# Patient Record
Sex: FEMALE | Race: BLACK OR AFRICAN AMERICAN | NOT HISPANIC OR LATINO | Employment: OTHER | URBAN - METROPOLITAN AREA
[De-identification: names, ages, dates, MRNs, and addresses within clinical notes are randomized per-mention and may not be internally consistent; named-entity substitution may affect disease eponyms.]

---

## 2021-11-22 ENCOUNTER — APPOINTMENT (OUTPATIENT)
Dept: GENERAL RADIOLOGY | Facility: HOSPITAL | Age: 41
End: 2021-11-22

## 2021-11-22 ENCOUNTER — HOSPITAL ENCOUNTER (EMERGENCY)
Facility: HOSPITAL | Age: 41
Discharge: HOME | End: 2021-11-22
Attending: EMERGENCY MEDICINE | Admitting: EMERGENCY MEDICINE

## 2021-11-22 VITALS
WEIGHT: 197.75 LBS | RESPIRATION RATE: 18 BRPM | HEART RATE: 98 BPM | SYSTOLIC BLOOD PRESSURE: 125 MMHG | OXYGEN SATURATION: 100 % | BODY MASS INDEX: 29.97 KG/M2 | TEMPERATURE: 98.9 F | HEIGHT: 68 IN | DIASTOLIC BLOOD PRESSURE: 85 MMHG

## 2021-11-22 DIAGNOSIS — J06.9 VIRAL UPPER RESPIRATORY TRACT INFECTION: ICD-10-CM

## 2021-11-22 DIAGNOSIS — Z20.822 COVID-19 VIRUS TEST RESULT UNKNOWN: Primary | ICD-10-CM

## 2021-11-22 LAB
QT INTERVAL: 356 MS
SARS-COV-2 N GENE RESP QL NAA+PROBE: NOT DETECTED

## 2021-11-22 PROCEDURE — 93005 ELECTROCARDIOGRAM TRACING: CPT | Performed by: EMERGENCY MEDICINE

## 2021-11-22 PROCEDURE — 25010000002 DEXAMETHASONE PER 1 MG: Performed by: EMERGENCY MEDICINE

## 2021-11-22 PROCEDURE — 93010 ELECTROCARDIOGRAM REPORT: CPT | Performed by: INTERNAL MEDICINE

## 2021-11-22 PROCEDURE — 71045 X-RAY EXAM CHEST 1 VIEW: CPT

## 2021-11-22 PROCEDURE — 87635 SARS-COV-2 COVID-19 AMP PRB: CPT | Performed by: EMERGENCY MEDICINE

## 2021-11-22 PROCEDURE — 99283 EMERGENCY DEPT VISIT LOW MDM: CPT

## 2021-11-22 RX ORDER — DEXAMETHASONE SODIUM PHOSPHATE 10 MG/ML
8 INJECTION INTRAMUSCULAR; INTRAVENOUS ONCE
Status: COMPLETED | OUTPATIENT
Start: 2021-11-22 | End: 2021-11-22

## 2021-11-22 RX ORDER — ALBUTEROL SULFATE 90 UG/1
2 AEROSOL, METERED RESPIRATORY (INHALATION) EVERY 4 HOURS PRN
Qty: 6.7 G | Refills: 0 | Status: SHIPPED | OUTPATIENT
Start: 2021-11-22

## 2021-11-22 RX ORDER — DEXAMETHASONE 4 MG/1
4 TABLET ORAL
Qty: 4 TABLET | Refills: 0 | Status: SHIPPED | OUTPATIENT
Start: 2021-11-22

## 2021-11-22 RX ORDER — FERROUS SULFATE 325(65) MG
325 TABLET ORAL
COMMUNITY

## 2021-11-22 RX ORDER — DEXTROMETHORPHAN HYDROBROMIDE AND PROMETHAZINE HYDROCHLORIDE 15; 6.25 MG/5ML; MG/5ML
5 SYRUP ORAL 4 TIMES DAILY PRN
Qty: 80 ML | Refills: 0 | Status: SHIPPED | OUTPATIENT
Start: 2021-11-22

## 2021-11-22 RX ADMIN — DEXAMETHASONE SODIUM PHOSPHATE 8 MG: 10 INJECTION INTRAMUSCULAR; INTRAVENOUS at 01:15

## 2021-11-24 ENCOUNTER — TELEMEDICINE (OUTPATIENT)
Dept: FAMILY MEDICINE CLINIC | Facility: TELEHEALTH | Age: 41
End: 2021-11-24

## 2021-11-24 DIAGNOSIS — R11.2 NAUSEA AND VOMITING, INTRACTABILITY OF VOMITING NOT SPECIFIED, UNSPECIFIED VOMITING TYPE: ICD-10-CM

## 2021-11-24 DIAGNOSIS — Z20.822 ENCOUNTER BY TELEHEALTH FOR SUSPECTED COVID-19: Primary | ICD-10-CM

## 2021-11-24 DIAGNOSIS — R52 BODY ACHES: ICD-10-CM

## 2021-11-24 DIAGNOSIS — R19.7 DIARRHEA, UNSPECIFIED TYPE: ICD-10-CM

## 2021-11-24 DIAGNOSIS — R43.2 LOSS OF TASTE: ICD-10-CM

## 2021-11-24 PROCEDURE — 99213 OFFICE O/P EST LOW 20 MIN: CPT | Performed by: NURSE PRACTITIONER

## 2021-11-24 PROCEDURE — 87635 SARS-COV-2 COVID-19 AMP PRB: CPT | Performed by: NURSE PRACTITIONER

## 2021-11-24 RX ORDER — ONDANSETRON 4 MG/1
4 TABLET, ORALLY DISINTEGRATING ORAL EVERY 8 HOURS PRN
Qty: 9 TABLET | Refills: 0 | Status: SHIPPED | OUTPATIENT
Start: 2021-11-24

## 2021-11-24 NOTE — PROGRESS NOTES
You have chosen to receive care through a telehealth visit.  Do you consent to use a video/audio connection for your medical care today? Yes     CHIEF COMPLAINT  Chief Complaint   Patient presents with   • Nausea   • Vomiting   • Generalized Body Aches   • Loss Of Taste   • Exposure To Known Illness         HPI  Pedro Watson is a 41 y.o. female  presents with complaint of4 days of nausea, vomiting, body aches, loss of apetite and loss of taste. Her daughter was diagnosed with covid 19 last week and she was around her several days then became symptomatic. She tested negative 2 days ago but is requesting a re test due to her worsening symptoms. She has been vaccinated against covid 19. She has not used anything for symptoms.     Review of Systems   Constitutional: Positive for activity change, appetite change and fatigue.   HENT: Negative.    Respiratory: Negative.    Cardiovascular: Negative.    Gastrointestinal: Positive for abdominal pain (generalized), diarrhea, nausea and vomiting. Negative for abdominal distention, blood in stool and constipation.   Musculoskeletal: Positive for myalgias.   Skin: Negative.    Hematological: Negative.    Psychiatric/Behavioral: Negative.        Past Medical History:   Diagnosis Date   • Anemia    • Asthma        No family history on file.    Social History     Socioeconomic History   • Marital status:    Tobacco Use   • Smoking status: Never Smoker   • Smokeless tobacco: Never Used   Vaping Use   • Vaping Use: Never used   Substance and Sexual Activity   • Alcohol use: Yes     Comment: occasional   • Drug use: Never   • Sexual activity: Defer         There were no vitals taken for this visit.    PHYSICAL EXAM  Physical Exam   Constitutional: She is oriented to person, place, and time. She appears well-developed and well-nourished. She does not have a sickly appearance. She does not appear ill. No distress.   HENT:   Head: Normocephalic and atraumatic.   Pulmonary/Chest:  Effort normal.  No respiratory distress.  Abdominal: She exhibits no distension. There is abdominal tenderness in the periumbilical area.   Neurological: She is alert and oriented to person, place, and time.   Psychiatric: She has a normal mood and affect.   Vitals reviewed.      Results for orders placed or performed during the hospital encounter of 11/22/21   COVID-19,CEPHEID/EUSEBIO/BDMAX,COR/LUIZ/PAD/NICOLAS IN-HOUSE(OR EMERGENT/ADD-ON),NP SWAB IN TRANSPORT MEDIA 3-4 HR TAT, RT-PCR - Swab, Nasopharynx    Specimen: Nasopharynx; Swab   Result Value Ref Range    COVID19 Not Detected Not Detected - Ref. Range   ECG 12 Lead   Result Value Ref Range    QT Interval 356 ms       Diagnoses and all orders for this visit:    1. Encounter by telehealth for suspected COVID-19 (Primary)  -     COVID-19,CEPHEID/EUSEBIO/BDMAX,COR/LUIZ/PAD/NICOLAS IN-HOUSE(OR EMERGENT/ADD-ON),NP SWAB IN TRANSPORT MEDIA 3-4 HR TAT, RT-PCR - Swab, Nasopharynx; Future  -     QUESTIONNAIRE SERIES    2. Nausea and vomiting, intractability of vomiting not specified, unspecified vomiting type  -     ondansetron ODT (Zofran ODT) 4 MG disintegrating tablet; Place 1 tablet on the tongue Every 8 (Eight) Hours As Needed for Nausea or Vomiting.  Dispense: 9 tablet; Refill: 0    3. Diarrhea, unspecified type    4. Body aches    5. Loss of taste    rest and decaffeinated fluids.   Quarantine until test results.   Ibu 400-600 mg po every 6 hours prn body aches.   Vitamin C, D and zinc.       FOLLOW-UP  As discussed during visit with PCP/[facility] if no improvement or Urgent Care/Emergency Department if worsening of symptoms    Patient verbalizes understanding of medication dosage, comfort measures, instructions for treatment and follow-up.    Gladis Mc, APRN  11/24/2021  15:46 EST    This visit was performed via Telehealth.  This patient has been instructed to follow-up with their primary care provider if their symptoms worsen or the treatment provided does not  resolve their illness.

## 2021-11-24 NOTE — PATIENT INSTRUCTIONS
10 Things You Can Do to Manage Your COVID-19 Symptoms at Home  If you have possible or confirmed COVID-19:  1. Stay home except to get medical care.  2. Monitor your symptoms carefully. If your symptoms get worse, call your healthcare provider immediately.  3. Get rest and stay hydrated.  4. If you have a medical appointment, call the healthcare provider ahead of time and tell them that you have or may have COVID-19.  5. For medical emergencies, call 911 and notify the dispatch personnel that you have or may have COVID-19.  6. Cover your cough and sneezes with a tissue or use the inside of your elbow.  7. Wash your hands often with soap and water for at least 20 seconds or clean your hands with an alcohol-based hand  that contains at least 60% alcohol.  8. As much as possible, stay in a specific room and away from other people in your home. Also, you should use a separate bathroom, if available. If you need to be around other people in or outside of the home, wear a mask.  9. Avoid sharing personal items with other people in your household, like dishes, towels, and bedding.  10. Clean all surfaces that are touched often, like counters, tabletops, and doorknobs. Use household cleaning sprays or wipes according to the label instructions.  cdc.gov/coronavirus  07/16/2021  This information is not intended to replace advice given to you by your health care provider. Make sure you discuss any questions you have with your health care provider.  Document Revised: 08/04/2021 Document Reviewed: 08/04/2021  Elsevier Patient Education © 2021 [employer] Inc[employer]      Vomissements chez l’adulte  Vomiting, Adult  Mira vomissements se produisent lorsque le contenu de l'estomac est projeté vers le haut et hors de la bouche. Souvent, les personnes ont mira nausées herson de vomir. Les vomissements peuvent vous affaiblir et vous déshydrater. La déshydratation peut entraîner une sensation de fatigue et de soif, vous dessécher la bouche  et diminuer la fréquence à laquelle vous urinez. Les personnes âgées, les personnes malades et celles dont le système de défense de l'organisme (système immunitaire) est affaibli sont plus exposées au risque de déshydratation. Il est important de traiter les vomissements en suivant les recommandations de votre prestataire de soins de santé.  Suivez les instructions suivantes à domicile :    Alimentation et boissons         Suivez les recommandations de votre prestataire de soins de santé concernant les points suivants :  · Prenez une solution de réhydratation orale (SRO). C'est une boisson shamika est vendue dans les pharmacies et autres points de vente.  · Mangez autant que possible mira aliments sans trop de saveur et faciles à digérer en petites quantités. Rock aliments incluent les bananes, la compote de pommes, le tatiana, les viandes maigres, les toasts et les craquelins.  · Buvez lentement et autant que possible mira liquides clairs en petites quantités. Les liquides clairs incluent l'eau, les copeaux de glace, les boissons sportives à faible teneur en calories et le jus de fruits dilué avec de l'eau.  · Évitez les liquides contenant beaucoup de sucre ou de caféine, comme les boissons énergisantes, les boissons sportives et les sodas.  · Évitez la consommation d'alcool.  · Évitez les aliments épicés ou gras.    Instructions générales  · Lavez-vous les jasen souvent avec du desi et de l'eau. Si vous n'avez pas de desi et d'eau à disposition, utilisez un désinfectant pour les jasen. Assurez-vous que tous les membres de votre famille se lavent les jasen régulièrement.  · Prenez roland médicaments en vente sari et kim ordonnance en suivant scrupuleusement les instructions de votre prestataire de soins de santé.  · Restez chez vous et reposez-vous pendant votre convalescence.  · Surveillez roland symptômes pour déceler tout changement.  · Rendez-vous à toutes roland visites de suivi, comme prévu par votre prestataire de soins de  santé. C'est important.  Prenez contact avec un prestataire de soins de santé si :  · Semaj vomissements s'aggravent.  · Vous avez de nouveaux symptômes.  · Vous avez de la fièvre.  · Vous ne pouvez pas boire de liquides sans vomir.  · Vous avez eliezer étourdissements ou eliezer vertiges.  · Vous avez eliezer maux de tête.  · Vous avez eliezer crampes musculaires.  · Vous présentez une éruption cutanée.  · Vous avez eliezer douleurs lors de la miction.  Demandez immédiatement de l'aide si :  · Vous ressentez eliezer douleurs dans la poitrine, le cou, le bras ou la mâchoire.  · Vous vous sentez extrêmement faible ou vous vous évanouissez.  · Vous avez eliezer vomissements persistants.  · Semaj vomissements sont rouge vif ou noir comme le café moulu.  · Vous avez eliezer selles sanglantes ou noires ou eliezer selles shamika ressemblent à du goudron.  · Vous avez eliezer maux de tête intenses et/ou un cou raide.  · Vous ressentez de fritz douleurs, eliezer crampes ou un gonflement au niveau de l'abdomen.  · Vous avez de la difficulté à respirer ou vous respirez très rapidement.  · Votre cœur bat très corrie.  · Votre peau est froide et moite.  · Vous vous sentez désorienté(e).  · Vous présentez eliezer signes de déshydratation, tels que :  ? Une urine foncée, peu ou pas d'urine.  ? Eliezer lèvres gercées.  ? Une bouche sèche.  ? Eliezer yeux enfoncés.  ? Une somnolence.  ? Une asthénie.  Rock symptômes peuvent être le tabby d'un problème grave et constituer une situation d'urgence. N'attendez pas de voir si les symptômes disparaissent. Vous devez consulter immédiatement un médecin. Appelez les services d'urgence locaux (911 [place] Ne prenez pas le volant, faites-vous accompagner à l'hôpital.  Résumé  · Eliezer vomissements se produisent lorsque le contenu de l'estomac est projeté vers le haut et hors de la bouche. Les vomissements peuvent vous déshydrater. Les personnes âgées, les personnes malades et celles dont le système immunitaire est affaibli sont plus exposées au risque  de déshydratation.  · Il est important de traiter les vomissements en suivant les recommandations de votre prestataire de soins de santé. Suivez les instructions de votre prestataire de soins de santé concernant ce que vous pouvez manger et boire.  · Lavez-vous les jasen souvent avec du desi et de l'eau. Si vous n'avez pas de desi et d'eau à disposition, utilisez un désinfectant pour les jasen. Assurez-vous que tous les membres de votre famille se lavent les jasen régulièrement.  · Surveillez votre état de santé pour déceler tout changement et tout tabby de déshydratation.  · Rendez-vous à toutes roland visites de suivi, comme prévu par votre prestataire de soins de santé. C'est important.  Rock conseils et renseignements ne sauraient se substituer à l’david médical de votre prestataire de soins de santé. Par conséquent, il est primordial de parler de toutes roland préoccupations avec votre prestataire de soins de santé.  Document Revised: 07/23/2019 Document Reviewed: 07/23/2019  Elsevier Patient Education © 2021 [employer]    Náuseas, en adultos  Nausea, Adult  Las náuseas son michael sensación de malestar en el estómago o de que se está por vomitar. Las náuseas en sí a menudo no constituyen michael preocupación importante, pam pueden ser un signo temprano de problemas médicos más graves. Si las náuseas empeoran, pueden provocar vómitos. Si los vómitos empeoran o si usted no puede beber suficiente líquido, corre el riesgo de deshidratarse. La deshidratación puede causarle cansancio, sed, sequedad en la boca y disminución en la frecuencia con la que orina. Los adultos mayores y las personas que tienen otras enfermedades o un sistema de defensa (sistema inmunitario) débil tienen mayor riesgo de sufrir deshidratación. Los objetivos principales del tratamiento de las náuseas son los siguientes:  · Aliviar carson náuseas.  · Restringir los episodios reiterados de náuseas.  · Prevenir los vómitos y la deshidratación.  Siga estas  indicaciones en fontenot casa:  Controle carson síntomas para detectar cualquier cambio. Informe al médico acerca de los cambios. Siga las siguientes indicaciones destiny se lo haya indicado fontenot médico.  Comida y bebida         · Talmage michael solución de rehidratación oral (SRO). Esta es michael bebida que se vende en farmacias y tiendas minoristas.  · En la medida en que pueda, keke líquidos juana lentamente y en pequeñas cantidades. Los líquidos juana incluyen agua, cubitos de hielo, bebidas deportivas bajas en calorías y jugo de fruta rebajado con agua (jugo de fruta diluido).  · En la medida en que pueda, consuma alimentos blandos y fáciles de digerir en pequeñas cantidades. Estos alimentos incluyen bananas, compota de manzana, arroz, rajan magras, tostadas y galletas saladas.  · Evite consumir líquidos que contengan mucha azúcar o cafeína, destiny bebidas energéticas, bebidas deportivas y refrescos.  · Evite richard alcohol.  · Evite los alimentos condimentados o con alto contenido de grasa.  Indicaciones generales  · Talmage los medicamentos de venta sari y los recetados solamente destiny se lo haya indicado el médico.  · Descanse en fontenot casa mientras se recupera.  · Keke suficiente líquido destiny para mantener la orina de color amarillo pálido.  · Cuando sienta náuseas, respire lenta y profundamente.  · Evite oler cosas que tengan olores liborio.  · Lávese las dmitriy frecuentemente usando agua y jabón. Use desinfectante para dmitriy si no dispone de agua y jabón.  · Asegúrese de que todas las personas que viven en fontenot casa se laven alec las dmitriy y con frecuencia.  · Concurra a todas las visitas de control destiny se lo haya indicado el médico. [place] es importante.  Comuníquese con un médico si:  · Las náuseas empeoran.  · Las náuseas no desaparecen después de dos días.  · Vomita.  · No puede beber líquidos sin vomitar.  · Tiene alguno de los siguientes síntomas:  ? Síntomas nuevos.  ? Fiebre.  ? Dolor de rosetta.  ? Calambres  musculares.  ? Erupción cutánea.  ? Dolor al orinar.  · Se siente aturdido o mareado.  Solicite ayuda inmediatamente si:  · Siente dolor en el pecho, el sherrell, los brazos o la mandíbula.  · Se siente muy débil o se desmaya.  · Vomita y el vómito es de color anand intenso o se asemeja al poso del café.  · Tiene heces con marielle, de color jaiden, o con aspecto alquitranado.  · Siente dolor de rosetta intenso, rigidez en el sherrell, o ambas cosas.  · Tiene dolor intenso, cólicos o distensión abdominal.  · Tiene dificultades para respirar o respira muy rápido.  · Fontenot corazón late muy rápidamente.  · Siente la piel fría y húmeda.  · Se siente confundido.  · Tiene signos de deshidratación, destiny los siguientes:  ? Orina de color oscuro, muy escasa o falta de orina.  ? Labios agrietados.  ? Sequedad de boca.  ? Ojos hundidos.  ? Somnolencia.  ? Debilidad.  Estos síntomas pueden representar un problema grave que constituye michael emergencia. No espere a carmelo si los síntomas desaparecen. Solicite atención médica de inmediato. Comuníquese con el servicio de emergencias de fontenot localidad (911 en los [place]). No conduzca por carson propios medios hasta el hospital.  Resumen  · Las náuseas son michael sensación de malestar en el estómago o de que se está por vomitar. Las náuseas en sí a menudo no constituyen michael preocupación importante, pam pueden ser un signo temprano de problemas médicos más graves.  · Si los vómitos empeoran o si usted no puede beber suficiente líquido, corre el riesgo de deshidratarse.  · Siga las recomendaciones sobre qué debe comer y beber y use los medicamentos de venta sari y recetados solamente destiny se lo haya indicado el médico.  · Comuníquese con un médico de inmediato si los síntomas empeoran o si presenta nuevos síntomas.  · Concurra a todas las visitas de control destiny se lo haya indicado el médico. [place] es importante.  Esta información no tiene destiny fin reemplazar el consejo del médico. Asegúrese de  hacerle al médico cualquier pregunta que tenga.  Document Revised: 08/08/2019 Document Reviewed: 08/08/2019  Elsevier Patient Education © 2021 Elsevier Inc.  COVID-19&nbsp;: Différence entre quarantaine et isolement  COVID-19: Quarantine vs. Isolation  La QUARANTAINE maintient une personne ayant été en contact étroit avec une personne atteinte de COVID-19 à l’écart mira autres.  Si vous avez été en contact étroit avec une personne atteinte de COVID-19  · La meilleure façon de vous protéger et de protéger les autres est de rester chez vous pendant les 14 jours shamika suivront votre dernier contact. Consultez le site internet du département chargé de la santé de [facility] lieu de résidence pour connaître les options disponibles dans [facility] pour éventuellement raccourcir cette période de quarantaine.  · Vérifiez votre température deux fois par jour et surveillez l’apparition de symptômes de COVID-19.  · Si possible, restez à l’écart mira personnes présentant un risque plus élevé d’être gravement malades si elles contractent la COVID-19.  L’ISOLEMENT maintient une personne malade ou testée positive pour la COVID-19 et n’ayant aucun symptôme à l’écart mira autres personnes, même de celles vivant sous son toit.  Si vous êtes malade et si vous pensez avoir, ou savez que vous avez, la COVID-19  · Restez chez vous jusqu’à ce que  ? Au moins 10 jours se soient écoulés depuis l’apparition de roland premiers symptômes et  ? Au moins 24 heures se soient écoulées sans que vous ayez eu de fièvre et sans prendre de médicaments pour faire baisser la fièvre et  ? Les symptômes se soient améliorés  Si le résultat de votre test de dépistage de la COVID-19 s’est avéré positif, mais que vous ne présentez aucun symptôme  · Restez chez vous jusqu’à ce que  ? 10 jours se soient écoulés depuis le résultat positif de votre test  Si vous vivez avec d’autres personnes, restez dans une « chambre de malade » ou dans une zone à part et à l’écart mira  autres personnes et mira animaux, y compris mira animaux de compagnie. Si nikko est possible, utilisez une autre salle de lex.  cdc.gov/coronavirus  07/27/2020  Rock conseils et renseignements ne sauraient se substituer à l’david médical de votre prestataire de soins de santé. Par conséquent, il est primordial de parler de toutes roland préoccupations avec votre prestataire de soins de santé.  Document Revised: 07/13/2021 Document Reviewed: 07/13/2021  Elsevier Patient Education © 2021 Elsevier Inc.

## 2022-06-09 ENCOUNTER — HOSPITAL ENCOUNTER (EMERGENCY)
Facility: HOSPITAL | Age: 42
Discharge: HOME | End: 2022-06-09
Attending: EMERGENCY MEDICINE | Admitting: EMERGENCY MEDICINE

## 2022-06-09 ENCOUNTER — APPOINTMENT (OUTPATIENT)
Dept: GENERAL RADIOLOGY | Facility: HOSPITAL | Age: 42
End: 2022-06-09

## 2022-06-09 VITALS
OXYGEN SATURATION: 99 % | WEIGHT: 221.34 LBS | HEART RATE: 61 BPM | RESPIRATION RATE: 20 BRPM | TEMPERATURE: 98.1 F | BODY MASS INDEX: 33.55 KG/M2 | HEIGHT: 68 IN | SYSTOLIC BLOOD PRESSURE: 118 MMHG | DIASTOLIC BLOOD PRESSURE: 82 MMHG

## 2022-06-09 DIAGNOSIS — R00.2 PALPITATIONS: Primary | ICD-10-CM

## 2022-06-09 LAB
ALBUMIN SERPL-MCNC: 4.4 G/DL (ref 3.5–5.2)
ALBUMIN/GLOB SERPL: 1.3 G/DL
ALP SERPL-CCNC: 104 U/L (ref 39–117)
ALT SERPL W P-5'-P-CCNC: 25 U/L (ref 1–33)
ANION GAP SERPL CALCULATED.3IONS-SCNC: 10.8 MMOL/L (ref 5–15)
AST SERPL-CCNC: 25 U/L (ref 1–32)
BASOPHILS # BLD AUTO: 0.02 10*3/MM3 (ref 0–0.2)
BASOPHILS NFR BLD AUTO: 0.4 % (ref 0–1.5)
BILIRUB SERPL-MCNC: 0.2 MG/DL (ref 0–1.2)
BUN SERPL-MCNC: 11 MG/DL (ref 6–20)
BUN/CREAT SERPL: 12.8 (ref 7–25)
CALCIUM SPEC-SCNC: 9.9 MG/DL (ref 8.6–10.5)
CHLORIDE SERPL-SCNC: 99 MMOL/L (ref 98–107)
CK MB SERPL-CCNC: 1.85 NG/ML
CK SERPL-CCNC: 225 U/L (ref 20–180)
CO2 SERPL-SCNC: 26.2 MMOL/L (ref 22–29)
CREAT SERPL-MCNC: 0.86 MG/DL (ref 0.57–1)
DEPRECATED RDW RBC AUTO: 47.7 FL (ref 37–54)
EGFRCR SERPLBLD CKD-EPI 2021: 86.1 ML/MIN/1.73
EOSINOPHIL # BLD AUTO: 0.05 10*3/MM3 (ref 0–0.4)
EOSINOPHIL NFR BLD AUTO: 0.9 % (ref 0.3–6.2)
ERYTHROCYTE [DISTWIDTH] IN BLOOD BY AUTOMATED COUNT: 14.7 % (ref 12.3–15.4)
GLOBULIN UR ELPH-MCNC: 3.5 GM/DL
GLUCOSE SERPL-MCNC: 93 MG/DL (ref 65–99)
HCT VFR BLD AUTO: 37.7 % (ref 34–46.6)
HGB BLD-MCNC: 12.3 G/DL (ref 12–15.9)
HOLD SPECIMEN: NORMAL
IMM GRANULOCYTES # BLD AUTO: 0.01 10*3/MM3 (ref 0–0.05)
IMM GRANULOCYTES NFR BLD AUTO: 0.2 % (ref 0–0.5)
LIPASE SERPL-CCNC: 41 U/L (ref 13–60)
LYMPHOCYTES # BLD AUTO: 1.94 10*3/MM3 (ref 0.7–3.1)
LYMPHOCYTES NFR BLD AUTO: 35.3 % (ref 19.6–45.3)
MAGNESIUM SERPL-MCNC: 2 MG/DL (ref 1.6–2.6)
MCH RBC QN AUTO: 28.9 PG (ref 26.6–33)
MCHC RBC AUTO-ENTMCNC: 32.6 G/DL (ref 31.5–35.7)
MCV RBC AUTO: 88.5 FL (ref 79–97)
MONOCYTES # BLD AUTO: 0.3 10*3/MM3 (ref 0.1–0.9)
MONOCYTES NFR BLD AUTO: 5.5 % (ref 5–12)
NEUTROPHILS NFR BLD AUTO: 3.18 10*3/MM3 (ref 1.7–7)
NEUTROPHILS NFR BLD AUTO: 57.7 % (ref 42.7–76)
NRBC BLD AUTO-RTO: 0 /100 WBC (ref 0–0.2)
NT-PROBNP SERPL-MCNC: 13.7 PG/ML (ref 0–450)
PLATELET # BLD AUTO: 282 10*3/MM3 (ref 140–450)
PMV BLD AUTO: 9.9 FL (ref 6–12)
POTASSIUM SERPL-SCNC: 3.8 MMOL/L (ref 3.5–5.2)
PROT SERPL-MCNC: 7.9 G/DL (ref 6–8.5)
QT INTERVAL: 402 MS
QT INTERVAL: 430 MS
RBC # BLD AUTO: 4.26 10*6/MM3 (ref 3.77–5.28)
SODIUM SERPL-SCNC: 136 MMOL/L (ref 136–145)
TROPONIN I SERPL-MCNC: 0 NG/ML (ref 0–0.6)
WBC NRBC COR # BLD: 5.5 10*3/MM3 (ref 3.4–10.8)
WHOLE BLOOD HOLD COAG: NORMAL
WHOLE BLOOD HOLD SPECIMEN: NORMAL

## 2022-06-09 PROCEDURE — 71045 X-RAY EXAM CHEST 1 VIEW: CPT

## 2022-06-09 PROCEDURE — 99283 EMERGENCY DEPT VISIT LOW MDM: CPT

## 2022-06-09 PROCEDURE — 82550 ASSAY OF CK (CPK): CPT

## 2022-06-09 PROCEDURE — 80053 COMPREHEN METABOLIC PANEL: CPT

## 2022-06-09 PROCEDURE — 85025 COMPLETE CBC W/AUTO DIFF WBC: CPT

## 2022-06-09 PROCEDURE — 93010 ELECTROCARDIOGRAM REPORT: CPT | Performed by: INTERNAL MEDICINE

## 2022-06-09 PROCEDURE — 82553 CREATINE MB FRACTION: CPT

## 2022-06-09 PROCEDURE — 83690 ASSAY OF LIPASE: CPT

## 2022-06-09 PROCEDURE — 83880 ASSAY OF NATRIURETIC PEPTIDE: CPT

## 2022-06-09 PROCEDURE — 84484 ASSAY OF TROPONIN QUANT: CPT

## 2022-06-09 PROCEDURE — 93005 ELECTROCARDIOGRAM TRACING: CPT | Performed by: EMERGENCY MEDICINE

## 2022-06-09 PROCEDURE — 83735 ASSAY OF MAGNESIUM: CPT

## 2022-06-09 PROCEDURE — 93005 ELECTROCARDIOGRAM TRACING: CPT

## 2022-06-09 PROCEDURE — 36415 COLL VENOUS BLD VENIPUNCTURE: CPT

## 2022-06-09 RX ORDER — SODIUM CHLORIDE 0.9 % (FLUSH) 0.9 %
10 SYRINGE (ML) INJECTION AS NEEDED
Status: DISCONTINUED | OUTPATIENT
Start: 2022-06-09 | End: 2022-06-09 | Stop reason: HOSPADM

## 2022-06-09 RX ORDER — ASPIRIN 81 MG/1
324 TABLET, CHEWABLE ORAL ONCE
Status: DISCONTINUED | OUTPATIENT
Start: 2022-06-09 | End: 2022-06-09